# Patient Record
Sex: FEMALE | Race: WHITE | NOT HISPANIC OR LATINO | URBAN - METROPOLITAN AREA
[De-identification: names, ages, dates, MRNs, and addresses within clinical notes are randomized per-mention and may not be internally consistent; named-entity substitution may affect disease eponyms.]

---

## 2016-07-28 LAB
HPV_EXT - HISTORICAL: NORMAL
PAP SMEAR - HIM PATIENT REPORTED: NORMAL

## 2017-09-15 ENCOUNTER — AMBULATORY - HEALTHEAST (OUTPATIENT)
Dept: MULTI SPECIALTY CLINIC | Facility: CLINIC | Age: 27
End: 2017-09-15

## 2017-09-15 LAB — PAP SMEAR - HIM PATIENT REPORTED: NORMAL

## 2019-10-24 ENCOUNTER — OFFICE VISIT - HEALTHEAST (OUTPATIENT)
Dept: FAMILY MEDICINE | Facility: CLINIC | Age: 29
End: 2019-10-24

## 2019-10-24 ENCOUNTER — COMMUNICATION - HEALTHEAST (OUTPATIENT)
Dept: FAMILY MEDICINE | Facility: CLINIC | Age: 29
End: 2019-10-24

## 2019-10-24 DIAGNOSIS — R53.83 FATIGUE, UNSPECIFIED TYPE: ICD-10-CM

## 2019-10-24 DIAGNOSIS — E55.9 AVITAMINOSIS D: ICD-10-CM

## 2019-10-24 LAB
ALBUMIN SERPL-MCNC: 3.3 G/DL (ref 3.5–5)
ALP SERPL-CCNC: 97 U/L (ref 45–120)
ALT SERPL W P-5'-P-CCNC: 21 U/L (ref 0–45)
ANION GAP SERPL CALCULATED.3IONS-SCNC: 9 MMOL/L (ref 5–18)
AST SERPL W P-5'-P-CCNC: 18 U/L (ref 0–40)
BASOPHILS # BLD AUTO: 0.1 THOU/UL (ref 0–0.2)
BASOPHILS NFR BLD AUTO: 1 % (ref 0–2)
BILIRUB SERPL-MCNC: 0.3 MG/DL (ref 0–1)
BUN SERPL-MCNC: 11 MG/DL (ref 8–22)
C REACTIVE PROTEIN LHE: 3.3 MG/DL (ref 0–0.8)
CALCIUM SERPL-MCNC: 9.4 MG/DL (ref 8.5–10.5)
CHLORIDE BLD-SCNC: 106 MMOL/L (ref 98–107)
CO2 SERPL-SCNC: 25 MMOL/L (ref 22–31)
CREAT SERPL-MCNC: 0.87 MG/DL (ref 0.6–1.1)
EOSINOPHIL # BLD AUTO: 0.2 THOU/UL (ref 0–0.4)
EOSINOPHIL NFR BLD AUTO: 2 % (ref 0–6)
ERYTHROCYTE [DISTWIDTH] IN BLOOD BY AUTOMATED COUNT: 10.9 % (ref 11–14.5)
GFR SERPL CREATININE-BSD FRML MDRD: >60 ML/MIN/1.73M2
GLUCOSE BLD-MCNC: 76 MG/DL (ref 70–125)
HCG UR QL: NEGATIVE
HCT VFR BLD AUTO: 39.2 % (ref 35–47)
HGB BLD-MCNC: 13.5 G/DL (ref 12–16)
LYMPHOCYTES # BLD AUTO: 2.5 THOU/UL (ref 0.8–4.4)
LYMPHOCYTES NFR BLD AUTO: 22 % (ref 20–40)
MCH RBC QN AUTO: 30.9 PG (ref 27–34)
MCHC RBC AUTO-ENTMCNC: 34.5 G/DL (ref 32–36)
MCV RBC AUTO: 90 FL (ref 80–100)
MONOCYTES # BLD AUTO: 0.7 THOU/UL (ref 0–0.9)
MONOCYTES NFR BLD AUTO: 6 % (ref 2–10)
NEUTROPHILS # BLD AUTO: 7.9 THOU/UL (ref 2–7.7)
NEUTROPHILS NFR BLD AUTO: 70 % (ref 50–70)
PLATELET # BLD AUTO: 349 THOU/UL (ref 140–440)
PMV BLD AUTO: 6.5 FL (ref 7–10)
POTASSIUM BLD-SCNC: 4.1 MMOL/L (ref 3.5–5)
PROT SERPL-MCNC: 7.1 G/DL (ref 6–8)
RBC # BLD AUTO: 4.37 MILL/UL (ref 3.8–5.4)
SODIUM SERPL-SCNC: 140 MMOL/L (ref 136–145)
TSH SERPL DL<=0.005 MIU/L-ACNC: 1.26 UIU/ML (ref 0.3–5)
WBC: 11.3 THOU/UL (ref 4–11)

## 2019-10-24 RX ORDER — ESTRADIOL 0.5 MG/1
0.5 TABLET ORAL
Refills: 1 | Status: SHIPPED | COMMUNITY
Start: 2019-08-31

## 2019-10-24 RX ORDER — ETONOGESTREL/ETHINYL ESTRADIOL .12-.015MG
1 RING, VAGINAL VAGINAL
Refills: 3 | Status: SHIPPED | COMMUNITY
Start: 2019-08-31

## 2019-10-24 ASSESSMENT — MIFFLIN-ST. JEOR: SCORE: 1426.68

## 2019-10-24 NOTE — ASSESSMENT & PLAN NOTE
The patient has overwhelming fatigue of approximately 3-4 weeks duration.  She otherwise appears quite well on exam.  She does not have depressive symptoms other than some anhedonia which is new in the last 3-4 weeks.  She is struggling with getting out of bed.  She reports a retrospective history of seasonal affective disorder.  She recently changed her job.  She is actively involved in the health care of her long-term partner who struggles with mental health and substance abuse.  She states that he is actually been more self-sufficient over the past 4-8 weeks.  - We confirm that the patient was not pregnant given the recent change in hormonal contraception.  - Well mental health certainly is on the differential, depression seems unlikely given that the patient does not present with her primary symptom is being depression.  - Her white blood cell count was mildly elevated.  This raises the prospect of mild persistent infection.  No other symptoms consistent with infection.  -The patient will try light box.  Other laboratory testing is pending at this time.  Additional testing will be completed based on her testing.  For now, recommending time as we wait for additional symptoms or resolution.  -Note: Patient will be losing insurance for several months at the end of this month.

## 2019-10-25 LAB
25(OH)D3 SERPL-MCNC: 26.5 NG/ML (ref 30–80)
25(OH)D3 SERPL-MCNC: 26.5 NG/ML (ref 30–80)

## 2019-10-29 ENCOUNTER — COMMUNICATION - HEALTHEAST (OUTPATIENT)
Dept: FAMILY MEDICINE | Facility: CLINIC | Age: 29
End: 2019-10-29

## 2019-10-31 ENCOUNTER — AMBULATORY - HEALTHEAST (OUTPATIENT)
Dept: LAB | Facility: CLINIC | Age: 29
End: 2019-10-31

## 2019-10-31 DIAGNOSIS — R53.83 FATIGUE, UNSPECIFIED TYPE: ICD-10-CM

## 2019-11-01 LAB — B BURGDOR IGG+IGM SER QL: 0.09 INDEX VALUE

## 2021-02-25 ENCOUNTER — TRANSFERRED RECORDS (OUTPATIENT)
Dept: MULTI SPECIALTY CLINIC | Facility: CLINIC | Age: 31
End: 2021-02-25

## 2021-02-25 LAB — PAP-ABSTRACT: NORMAL

## 2021-06-02 NOTE — PATIENT INSTRUCTIONS - HE
You will be starting to use a light-box for treatment of depression and fatigue in fall/winter months.  Use in the morning for 30 minutes every day.  If you find it beneficial you can add another 30 minutes in the afternoon.  Do not use at night as it can disrupt your sleep cycle.

## 2021-06-02 NOTE — PROGRESS NOTES
"Assessment/Plan:    Fatigue, unspecified type  The patient has overwhelming fatigue of approximately 3-4 weeks duration.  She otherwise appears quite well on exam.  She does not have depressive symptoms other than some anhedonia which is new in the last 3-4 weeks.  She is struggling with getting out of bed.  She reports a retrospective history of seasonal affective disorder.  She recently changed her job.  She is actively involved in the health care of her long-term partner who struggles with mental health and substance abuse.  She states that he is actually been more self-sufficient over the past 4-8 weeks.  - We confirm that the patient was not pregnant given the recent change in hormonal contraception.  - Well mental health certainly is on the differential, depression seems unlikely given that the patient does not present with her primary symptom is being depression.  - Her white blood cell count was mildly elevated.  This raises the prospect of mild persistent infection.  No other symptoms consistent with infection.  -The patient will try light box.  Other laboratory testing is pending at this time.  Additional testing will be completed based on her testing.  For now, recommending time as we wait for additional symptoms or resolution.  -Note: Patient will be losing insurance for several months at the end of this month.    Return in about 4 weeks (around 11/21/2019) for recheck if not improving.    Melo Kim MD  _______________________________    Chief Complaint   Patient presents with     [facility]     Fatigue     x 1 month      Subjective: Maday Petit is a 29 y.o. year old female who presents to clinic for the following chronic complaints/concerns:     fatigue:   - worse for the past month   - she wonders about seasonal affective disorder.  Last spring she had symptoms of anhedonia (\"fog and lack of hobbies.\") dramatic improvement with weather change in the spring.  She took vitamin d " "supplementation throughout the winter last year.  She has not tried a light box.  Job recently changed.  She has to self-motivate as she works at home.  Stress is greater.   - sleep: 8 hours of sleep but struggles to get out of bed for two hours   - she does not think that she is pregnant.  Now is on the nuvaring (new).  She has a new gyn.  New regimen is working \"great.\"     - mood: more reserved.  Less engaged.     - Gaining weight.    - long term boyfriend struggles with substance abuse.   - no caffiene    Review of systems is negative except for as shown in the HPI.    The following portions of the patient's history were reviewed and updated as appropriate: allergies, current medications, past family history, past medical history, past social history, past surgical history and problem list.    Objective:    height is 5' 6\" (1.676 m) and weight is 151 lb (68.5 kg). Her oral temperature is 98.6  F (37  C). Her blood pressure is 110/60 and her pulse is 90. Her respiration is 18 and oxygen saturation is 98%.   General: No acute distress  Eyes: No conjunctival injection, muscular icterus.  Extraocular movements intact.  Pupils equal round reactive to light and accommodating.  ENT: Tympanic members bilaterally gray and glistening.  Mild anterior and submandibular lymphadenopathy.  Posterior pharynx without tonsillar exudate or erythema.  The uvula is midline.  No thyromegaly.  Cardiac: Regular rate and rhythm.  Normal S1/S2, no murmurs of the gallops  Respiratory: Clear to auscultation bilaterally.  Abdomen: Soft, nontender, nondistended.  No hepatosplenomegaly.  No palpable fundus.  Skin: No rashes or lesions noted.  Anicteric.  Psych: Normal affect.  Normal rate speech per no tangentiality.  Denies suicidality.  Neurologic: Cranial nerves II through XII intact.  Walks with normal gait.  Alert and oriented x3.  Strength 5/5 in upper and lower extremities bilaterally.    Patient's most recent gynecology appointment " note was reviewed.  The patient has hormonally mediated migraine headaches which is why she is on the current oral contraceptive plan.  She had a Pap smear in 2017 which was normal.  The patient previously had ASCUS with subsequent normal Pap smears.    Recent Results (from the past 24 hour(s))   HM1 (CBC with Diff)   Result Value Ref Range    WBC 11.3 (H) 4.0 - 11.0 thou/uL    RBC 4.37 3.80 - 5.40 mill/uL    Hemoglobin 13.5 12.0 - 16.0 g/dL    Hematocrit 39.2 35.0 - 47.0 %    MCV 90 80 - 100 fL    MCH 30.9 27.0 - 34.0 pg    MCHC 34.5 32.0 - 36.0 g/dL    RDW 10.9 (L) 11.0 - 14.5 %    Platelets 349 140 - 440 thou/uL    MPV 6.5 (L) 7.0 - 10.0 fL    Neutrophils % 70 50 - 70 %    Lymphocytes % 22 20 - 40 %    Monocytes % 6 2 - 10 %    Eosinophils % 2 0 - 6 %    Basophils % 1 0 - 2 %    Neutrophils Absolute 7.9 (H) 2.0 - 7.7 thou/uL    Lymphocytes Absolute 2.5 0.8 - 4.4 thou/uL    Monocytes Absolute 0.7 0.0 - 0.9 thou/uL    Eosinophils Absolute 0.2 0.0 - 0.4 thou/uL    Basophils Absolute 0.1 0.0 - 0.2 thou/uL   Pregnancy (Beta-hCG, Qual), Urine   Result Value Ref Range    Pregnancy Test, Urine Negative Negative         Additional History from Old Records Summarized (2): no  Decision to Obtain Records (1): no  Radiology Tests Summarized or Ordered (1): no  Labs Reviewed or Ordered (1): yes  Medicine Test Summarized or Ordered (1): no  Independent Review of EKG or X-RAY(2 each): no    This note has been dictated using voice recognition software. Any grammatical or context distortions are unintentional and inherent to the software

## 2021-06-03 VITALS
WEIGHT: 151 LBS | HEART RATE: 90 BPM | TEMPERATURE: 98.6 F | DIASTOLIC BLOOD PRESSURE: 60 MMHG | BODY MASS INDEX: 24.27 KG/M2 | SYSTOLIC BLOOD PRESSURE: 110 MMHG | HEIGHT: 66 IN | RESPIRATION RATE: 18 BRPM | OXYGEN SATURATION: 98 %

## 2021-06-16 PROBLEM — R53.83 FATIGUE, UNSPECIFIED TYPE: Status: ACTIVE | Noted: 2019-10-24

## 2021-06-27 ENCOUNTER — HEALTH MAINTENANCE LETTER (OUTPATIENT)
Age: 31
End: 2021-06-27

## 2021-07-03 NOTE — ADDENDUM NOTE
Addendum Note by Melo Feliciano MD at 10/24/2019  2:00 PM     Author: Melo Feliciano MD Service: -- Author Type: Physician    Filed: 10/28/2019  6:35 AM Encounter Date: 10/24/2019 Status: Signed    : Melo Feliciano MD (Physician)    Addended by: MELO FELICIANO on: 10/28/2019 06:35 AM        Modules accepted: Orders

## 2021-08-10 ENCOUNTER — MYC MEDICAL ADVICE (OUTPATIENT)
Dept: FAMILY MEDICINE | Facility: CLINIC | Age: 31
End: 2021-08-10

## 2021-08-11 ENCOUNTER — OFFICE VISIT (OUTPATIENT)
Dept: FAMILY MEDICINE | Facility: CLINIC | Age: 31
End: 2021-08-11
Payer: COMMERCIAL

## 2021-08-11 VITALS
BODY MASS INDEX: 26.15 KG/M2 | HEART RATE: 78 BPM | RESPIRATION RATE: 20 BRPM | OXYGEN SATURATION: 98 % | SYSTOLIC BLOOD PRESSURE: 112 MMHG | WEIGHT: 162 LBS | TEMPERATURE: 98.1 F | DIASTOLIC BLOOD PRESSURE: 68 MMHG

## 2021-08-11 DIAGNOSIS — B86 SCABIES: Primary | ICD-10-CM

## 2021-08-11 DIAGNOSIS — L72.9 CYST OF SKIN: ICD-10-CM

## 2021-08-11 PROCEDURE — 99213 OFFICE O/P EST LOW 20 MIN: CPT | Performed by: FAMILY MEDICINE

## 2021-08-11 RX ORDER — PERMETHRIN 50 MG/G
CREAM TOPICAL
Qty: 60 G | Refills: 1 | Status: SHIPPED | OUTPATIENT
Start: 2021-08-11

## 2021-08-11 NOTE — ASSESSMENT & PLAN NOTE
Transient lesions for this patient and her significant other in distribution of loosely fits with scabies.  Time course and pruritic nature also fits with scabies.  Permethrin therapy prescribed for patient and her significant other.

## 2021-08-11 NOTE — PROGRESS NOTES
Assessment/Plan:    Cyst of skin  Less than half centimeter nodule over the left hip with tract to the surface.  Discussed differential including cyst or other.  If he continues to change in color/size/appearance, will consider excisional biopsy (or punch biopsy).  For now, watchful waiting.    Scabies  Transient lesions for this patient and her significant other in distribution of loosely fits with scabies.  Time course and pruritic nature also fits with scabies.  Permethrin therapy prescribed for patient and her significant other.     Return in about 1 week (around 8/18/2021) for Recheck if not improving.    Melo Kim MD  _______________________________    Chief Complaint   Patient presents with     Mass     left hip x1 year      Subjective: Maday Petit is a 30 year old year old female who I have seen in clinic before who presents with the following acute complaint(s):    Skin concerns  -Left hip nodule.  First noticed approximately 6 months ago.  Increasingly dark.  No drainage.  No discomfort.  No itching.  No trauma.  She wonders what this might be and if it needs to be excised.  -Lumps and bumps: Multiple locations including across the upper abdomen, in the axilla, in the legs.  Significant other with similar lesions.  No known contact with bedbugs or scabies.  They have looked at their bed and looked for bedbugs.    Review of Systems   Constitutional:        Review of systems negative except as noted in the HPI.   All other systems reviewed and are negative.       Histories reviewed:                 Objective:   /68 (BP Location: Left arm, Patient Position: Chair, Cuff Size: Adult Regular)   Pulse 78   Temp 98.1  F (36.7  C) (Oral)   Resp 20   Wt 73.5 kg (162 lb)   LMP 07/05/2021   SpO2 98%   Breastfeeding No   BMI 26.15 kg/m    Physical Exam  Nursing note reviewed.   Constitutional:       General: She is not in acute distress.     Appearance: Normal appearance. She is not  ill-appearing.   HENT:      Head: Normocephalic and atraumatic.   Eyes:      Extraocular Movements: Extraocular movements intact.      Conjunctiva/sclera: Conjunctivae normal.   Pulmonary:      Effort: Pulmonary effort is normal.   Skin:     Comments: Left hip has a less than 1 cm nodule tract to surface.  Overlying skin is dark in comparison to the surrounding area.  No discharge with palpation.  Left axilla with small 1-2 mm papule.  No tract underneath the surface.  Similar lesion on right upper abdomen.   Neurological:      Mental Status: She is alert and oriented to person, place, and time.   Psychiatric:         Attention and Perception: Attention normal.         Mood and Affect: Mood normal.         Speech: Speech normal.         Thought Content: Thought content normal.         No results found for this or any previous visit (from the past 48 hour(s)).  No results found for this visit on 08/11/21.    This note has been dictated using voice recognition software. Any grammatical or context distortions are unintentional and inherent to the software

## 2021-08-11 NOTE — ASSESSMENT & PLAN NOTE
Less than half centimeter nodule over the left hip with tract to the surface.  Discussed differential including cyst or other.  If he continues to change in color/size/appearance, will consider excisional biopsy (or punch biopsy).  For now, watchful waiting.

## 2021-10-17 ENCOUNTER — HEALTH MAINTENANCE LETTER (OUTPATIENT)
Age: 31
End: 2021-10-17

## 2022-07-24 ENCOUNTER — HEALTH MAINTENANCE LETTER (OUTPATIENT)
Age: 32
End: 2022-07-24

## 2022-10-02 ENCOUNTER — HEALTH MAINTENANCE LETTER (OUTPATIENT)
Age: 32
End: 2022-10-02

## 2023-08-12 ENCOUNTER — HEALTH MAINTENANCE LETTER (OUTPATIENT)
Age: 33
End: 2023-08-12